# Patient Record
Sex: FEMALE | Race: WHITE | NOT HISPANIC OR LATINO | Employment: UNEMPLOYED | URBAN - METROPOLITAN AREA
[De-identification: names, ages, dates, MRNs, and addresses within clinical notes are randomized per-mention and may not be internally consistent; named-entity substitution may affect disease eponyms.]

---

## 2018-06-10 ENCOUNTER — HOSPITAL ENCOUNTER (EMERGENCY)
Facility: HOSPITAL | Age: 33
Discharge: HOME | End: 2018-06-10
Attending: EMERGENCY MEDICINE | Admitting: EMERGENCY MEDICINE

## 2018-06-10 VITALS
BODY MASS INDEX: 31.89 KG/M2 | WEIGHT: 180 LBS | HEIGHT: 63 IN | SYSTOLIC BLOOD PRESSURE: 118 MMHG | OXYGEN SATURATION: 97 % | HEART RATE: 90 BPM | TEMPERATURE: 98.6 F | DIASTOLIC BLOOD PRESSURE: 78 MMHG | RESPIRATION RATE: 16 BRPM

## 2018-06-10 DIAGNOSIS — G90.A POTS (POSTURAL ORTHOSTATIC TACHYCARDIA SYNDROME): ICD-10-CM

## 2018-06-10 DIAGNOSIS — J30.9 ALLERGIC SINUSITIS: ICD-10-CM

## 2018-06-10 DIAGNOSIS — R42 DIZZINESS: Primary | ICD-10-CM

## 2018-06-10 LAB
ALBUMIN SERPL-MCNC: 4.24 G/DL (ref 3.2–4.8)
ALBUMIN/GLOB SERPL: 1.2 G/DL (ref 1.5–2.5)
ALP SERPL-CCNC: 70 U/L (ref 25–100)
ALT SERPL W P-5'-P-CCNC: 26 U/L (ref 7–40)
ANION GAP SERPL CALCULATED.3IONS-SCNC: 6 MMOL/L (ref 3–11)
AST SERPL-CCNC: 27 U/L (ref 0–33)
BASOPHILS # BLD AUTO: 0.01 10*3/MM3 (ref 0–0.2)
BASOPHILS NFR BLD AUTO: 0.1 % (ref 0–1)
BILIRUB SERPL-MCNC: 0.4 MG/DL (ref 0.3–1.2)
BUN BLD-MCNC: 9 MG/DL (ref 9–23)
BUN/CREAT SERPL: 12.9 (ref 7–25)
CALCIUM SPEC-SCNC: 8.9 MG/DL (ref 8.7–10.4)
CHLORIDE SERPL-SCNC: 108 MMOL/L (ref 99–109)
CO2 SERPL-SCNC: 25 MMOL/L (ref 20–31)
CREAT BLD-MCNC: 0.7 MG/DL (ref 0.6–1.3)
DEPRECATED RDW RBC AUTO: 41.1 FL (ref 37–54)
EOSINOPHIL # BLD AUTO: 0.09 10*3/MM3 (ref 0–0.3)
EOSINOPHIL NFR BLD AUTO: 1.2 % (ref 0–3)
ERYTHROCYTE [DISTWIDTH] IN BLOOD BY AUTOMATED COUNT: 12.1 % (ref 11.3–14.5)
GFR SERPL CREATININE-BSD FRML MDRD: 98 ML/MIN/1.73
GLOBULIN UR ELPH-MCNC: 3.5 GM/DL
GLUCOSE BLD-MCNC: 96 MG/DL (ref 70–100)
HCT VFR BLD AUTO: 39.2 % (ref 34.5–44)
HGB BLD-MCNC: 13.2 G/DL (ref 11.5–15.5)
IMM GRANULOCYTES # BLD: 0.02 10*3/MM3 (ref 0–0.03)
IMM GRANULOCYTES NFR BLD: 0.3 % (ref 0–0.6)
LYMPHOCYTES # BLD AUTO: 1.6 10*3/MM3 (ref 0.6–4.8)
LYMPHOCYTES NFR BLD AUTO: 21.6 % (ref 24–44)
MCH RBC QN AUTO: 30.9 PG (ref 27–31)
MCHC RBC AUTO-ENTMCNC: 33.7 G/DL (ref 32–36)
MCV RBC AUTO: 91.8 FL (ref 80–99)
MONOCYTES # BLD AUTO: 1.11 10*3/MM3 (ref 0–1)
MONOCYTES NFR BLD AUTO: 15 % (ref 0–12)
NEUTROPHILS # BLD AUTO: 4.58 10*3/MM3 (ref 1.5–8.3)
NEUTROPHILS NFR BLD AUTO: 61.8 % (ref 41–71)
PLATELET # BLD AUTO: 235 10*3/MM3 (ref 150–450)
PMV BLD AUTO: 9.9 FL (ref 6–12)
POTASSIUM BLD-SCNC: 3.9 MMOL/L (ref 3.5–5.5)
PROT SERPL-MCNC: 7.7 G/DL (ref 5.7–8.2)
RBC # BLD AUTO: 4.27 10*6/MM3 (ref 3.89–5.14)
SODIUM BLD-SCNC: 139 MMOL/L (ref 132–146)
WBC NRBC COR # BLD: 7.41 10*3/MM3 (ref 3.5–10.8)

## 2018-06-10 PROCEDURE — 96375 TX/PRO/DX INJ NEW DRUG ADDON: CPT

## 2018-06-10 PROCEDURE — 99284 EMERGENCY DEPT VISIT MOD MDM: CPT

## 2018-06-10 PROCEDURE — 25010000002 METOCLOPRAMIDE PER 10 MG: Performed by: EMERGENCY MEDICINE

## 2018-06-10 PROCEDURE — 25010000002 KETOROLAC TROMETHAMINE PER 15 MG: Performed by: EMERGENCY MEDICINE

## 2018-06-10 PROCEDURE — 96361 HYDRATE IV INFUSION ADD-ON: CPT

## 2018-06-10 PROCEDURE — 85025 COMPLETE CBC W/AUTO DIFF WBC: CPT | Performed by: EMERGENCY MEDICINE

## 2018-06-10 PROCEDURE — 93005 ELECTROCARDIOGRAM TRACING: CPT

## 2018-06-10 PROCEDURE — 96374 THER/PROPH/DIAG INJ IV PUSH: CPT

## 2018-06-10 PROCEDURE — 93005 ELECTROCARDIOGRAM TRACING: CPT | Performed by: EMERGENCY MEDICINE

## 2018-06-10 PROCEDURE — 80053 COMPREHEN METABOLIC PANEL: CPT | Performed by: EMERGENCY MEDICINE

## 2018-06-10 RX ORDER — METOCLOPRAMIDE HYDROCHLORIDE 5 MG/ML
10 INJECTION INTRAMUSCULAR; INTRAVENOUS ONCE
Status: COMPLETED | OUTPATIENT
Start: 2018-06-10 | End: 2018-06-10

## 2018-06-10 RX ORDER — KETOROLAC TROMETHAMINE 15 MG/ML
10 INJECTION, SOLUTION INTRAMUSCULAR; INTRAVENOUS ONCE
Status: COMPLETED | OUTPATIENT
Start: 2018-06-10 | End: 2018-06-10

## 2018-06-10 RX ORDER — FLUDROCORTISONE ACETATE 0.1 MG/1
0.1 TABLET ORAL DAILY
COMMUNITY

## 2018-06-10 RX ORDER — CETIRIZINE HYDROCHLORIDE 10 MG/1
10 TABLET ORAL DAILY
Qty: 30 TABLET | Refills: 0 | Status: SHIPPED | OUTPATIENT
Start: 2018-06-10

## 2018-06-10 RX ADMIN — METOCLOPRAMIDE 10 MG: 5 INJECTION, SOLUTION INTRAMUSCULAR; INTRAVENOUS at 18:55

## 2018-06-10 RX ADMIN — KETOROLAC TROMETHAMINE 10 MG: 15 INJECTION, SOLUTION INTRAMUSCULAR; INTRAVENOUS at 18:55

## 2018-06-10 RX ADMIN — SODIUM CHLORIDE 1000 ML: 9 INJECTION, SOLUTION INTRAVENOUS at 18:52

## 2018-06-10 NOTE — ED PROVIDER NOTES
Subjective   Terri Jacobo is a 33 y.o.female who presents to the ED with c/o dizziness with onset a couple of days ago. She reports that she suddenly developed room spinning dizziness with a pounding type headache across her forehead. Her headache worsens with light. She notes that she has POTS and when she feels over heated she develops similar sx. She states that she went to [facility] and took her blood pressure which recorded up to be 140/104. She states that she took her POTS medication and recorded her blood pressure again to be 115/88. Soon thereafter, she developed severe room spinning dizziness and felt as if she was going to develop a syncopal type event which prompted her visit to the ED. She denies N/V, or any other complaints at this time. She denies any family hx of migraines.        History provided by:  Patient  Dizziness   Quality:  Room spinning  Severity:  Moderate  Onset quality:  Sudden  Timing:  Constant  Progression:  Worsening  Chronicity:  New  Relieved by:  None tried  Worsened by:  Nothing  Ineffective treatments:  None tried  Associated symptoms: headaches    Associated symptoms: no nausea and no vomiting        Review of Systems   Gastrointestinal: Negative for nausea and vomiting.   Neurological: Positive for dizziness and headaches.   All other systems reviewed and are negative.      Past Medical History:   Diagnosis Date   • Pott's disease        Allergies   Allergen Reactions   • Penicillins Rash       History reviewed. No pertinent surgical history.    History reviewed. No pertinent family history.    Social History     Social History   • Marital status: Significant Other     Social History Main Topics   • Smoking status: Never Smoker   • Alcohol use No   • Drug use: Unknown   • Sexual activity: No     Other Topics Concern   • Not on file         Objective   Physical Exam   Constitutional: She is oriented to person, place, and time. She appears well-developed and well-nourished. No  distress.   HENT:   Head: Normocephalic and atraumatic.   Nose: Nose normal.   Airway patent. Oropharynx benign.   Eyes: Conjunctivae and EOM are normal. Pupils are equal, round, and reactive to light. No scleral icterus.   No nystagmus.    Neck: Normal range of motion and phonation normal. Neck supple.   Cardiovascular: Normal rate, regular rhythm and normal heart sounds.    No murmur heard.  Pulmonary/Chest: Effort normal and breath sounds normal. No respiratory distress.   Abdominal: Soft. Bowel sounds are normal. There is no tenderness.   Neurological: She is alert and oriented to person, place, and time.   Cranial nerve motor functions are intact. 5/5 strength in all 4 extremities.   Skin: Skin is warm and dry.   Psychiatric: She has a normal mood and affect. Her behavior is normal.   Nursing note and vitals reviewed.      Procedures         ED Course       Recent Results (from the past 24 hour(s))   Comprehensive Metabolic Panel    Collection Time: 06/10/18  6:52 PM   Result Value Ref Range    Glucose 96 70 - 100 mg/dL    BUN 9 9 - 23 mg/dL    Creatinine 0.70 0.60 - 1.30 mg/dL    Sodium 139 132 - 146 mmol/L    Potassium 3.9 3.5 - 5.5 mmol/L    Chloride 108 99 - 109 mmol/L    CO2 25.0 20.0 - 31.0 mmol/L    Calcium 8.9 8.7 - 10.4 mg/dL    Total Protein 7.7 5.7 - 8.2 g/dL    Albumin 4.24 3.20 - 4.80 g/dL    ALT (SGPT) 26 7 - 40 U/L    AST (SGOT) 27 0 - 33 U/L    Alkaline Phosphatase 70 25 - 100 U/L    Total Bilirubin 0.4 0.3 - 1.2 mg/dL    eGFR Non African Amer 98 >60 mL/min/1.73    Globulin 3.5 gm/dL    A/G Ratio 1.2 (L) 1.5 - 2.5 g/dL    BUN/Creatinine Ratio 12.9 7.0 - 25.0    Anion Gap 6.0 3.0 - 11.0 mmol/L   CBC Auto Differential    Collection Time: 06/10/18  6:52 PM   Result Value Ref Range    WBC 7.41 3.50 - 10.80 10*3/mm3    RBC 4.27 3.89 - 5.14 10*6/mm3    Hemoglobin 13.2 11.5 - 15.5 g/dL    Hematocrit 39.2 34.5 - 44.0 %    MCV 91.8 80.0 - 99.0 fL    MCH 30.9 27.0 - 31.0 pg    MCHC 33.7 32.0 - 36.0 g/dL     RDW 12.1 11.3 - 14.5 %    RDW-SD 41.1 37.0 - 54.0 fl    MPV 9.9 6.0 - 12.0 fL    Platelets 235 150 - 450 10*3/mm3    Neutrophil % 61.8 41.0 - 71.0 %    Lymphocyte % 21.6 (L) 24.0 - 44.0 %    Monocyte % 15.0 (H) 0.0 - 12.0 %    Eosinophil % 1.2 0.0 - 3.0 %    Basophil % 0.1 0.0 - 1.0 %    Immature Grans % 0.3 0.0 - 0.6 %    Neutrophils, Absolute 4.58 1.50 - 8.30 10*3/mm3    Lymphocytes, Absolute 1.60 0.60 - 4.80 10*3/mm3    Monocytes, Absolute 1.11 (H) 0.00 - 1.00 10*3/mm3    Eosinophils, Absolute 0.09 0.00 - 0.30 10*3/mm3    Basophils, Absolute 0.01 0.00 - 0.20 10*3/mm3    Immature Grans, Absolute 0.02 0.00 - 0.03 10*3/mm3     Note: In addition to lab results from this visit, the labs listed above may include labs taken at another facility or during a different encounter within the last 24 hours. Please correlate lab times with ED admission and discharge times for further clarification of the services performed during this visit.    No orders to display     Vitals:    06/10/18 2008 06/10/18 2009 06/10/18 2053 06/10/18 2101   BP: 121/80 127/86  118/78   BP Location:    Right arm   Patient Position: Sitting Standing  Lying   Pulse: 78 90     Resp:    16   Temp:       SpO2:   97%    Weight:       Height:         Medications   sodium chloride 0.9 % bolus 1,000 mL (0 mL Intravenous Stopped 6/10/18 2002)   ketorolac (TORADOL) injection 10 mg (10 mg Intravenous Given 6/10/18 1855)   metoclopramide (REGLAN) injection 10 mg (10 mg Intravenous Given 6/10/18 1855)     ECG/EMG Results (last 24 hours)     Procedure Component Value Units Date/Time    ECG 12 Lead [[id number]] Collected:  06/10/18 1741     Updated:  06/10/18 1742                      [facility]    Final diagnoses:   Dizziness   POTS (postural orthostatic tachycardia syndrome)   Allergic sinusitis       Documentation assistance provided by kt Boothe.  Information recorded by the kt was done at my direction and has been verified and validated by me.      Qasim Boothe  06/10/18 1903       Milton Hanks MD  06/11/18 0029

## 2018-06-11 NOTE — DISCHARGE INSTRUCTIONS
Use Tylenol, Motrin, or Aleve for your headache.    Use the Zyrtec as the headache may be from allergic sinus congestion.    If you stay in the [facility], make an appointment for ongoing care with one of the listed [facility] practices.    Return to the ER if needed for any further worrisome problems.    Follow up with one of the [facility] physician groups below to setup primary care. If you have trouble following up, please call Radha Holliday, our transitional care nurse, at [phone].    (Dr. Coffey, Dr. Garrett, Dr. Pratt, and Dr. Berger.)  [facility], Primary Care, [phone], [address], [place] [address]    [facility], Primary Care, [phone], [address] [place], [address] [facility], Primary Care, [phone], [address] [place], [address] [facility] Medical [facility], Primary Care, [phone], [address] [facility], [address] [place], [address]     [place] [address] [facility], Primary Care, [phone], [address], [address] [place], [address]    [place] [address] [facility], Primary Care, [phone], [address]    [place], [address] [facility], Primary Care, [phone], [address], [address] [place], [address] [facility], Primary Care, [phone], [address] [facility] [address], [address] [place], [address] [facility]) [facility], Primary Care, [phone], [address], [address] [place], [address] [facility], Primary Care, [phone], [address], [address] [place], [address] [place] [address], Primary Care, [phone], [address] [place]  [address], [place], [address] [place] [address], Primary Care, [phone], [address] [facility] [place], [facility]

## 2022-03-18 PROBLEM — F90.2 ATTENTION DEFICIT HYPERACTIVITY DISORDER (ADHD), COMBINED TYPE: Status: ACTIVE | Noted: 2019-05-15

## 2022-03-19 PROBLEM — F43.10 POST TRAUMATIC STRESS DISORDER (PTSD): Status: ACTIVE | Noted: 2019-05-15

## 2023-05-24 RX ORDER — DICYCLOMINE HYDROCHLORIDE 10 MG/1
10 CAPSULE ORAL
COMMUNITY

## 2023-05-24 RX ORDER — RANITIDINE 150 MG/1
TABLET ORAL
COMMUNITY
Start: 2019-05-09

## 2023-05-24 RX ORDER — GUANFACINE 1 MG/1
TABLET, EXTENDED RELEASE ORAL
COMMUNITY
Start: 2019-05-09